# Patient Record
(demographics unavailable — no encounter records)

---

## 2024-10-07 NOTE — PHYSICAL EXAM
[de-identified] :   Bilateral breast/axilla/supraclavicular area: No discharge, or adenopathy.  3 cm hematoma retroareolar right breast

## 2024-10-07 NOTE — PLAN
[TextEntry] : Reviewed imaging findings and discussed results with patient. Discussed diagnosis of ALH with the patient. Patient is agreeable to continue with image surveillance at this time.  I discussed genetic testing with the patient in view of strong family history of breast cancer.  Patient states that she is interested, however is not ready to have testing performed now and will reach out to the office when she is ready. Patient will have B/L MG&US now as she is over due. If benign, she is to have short term follow up MRI 3/2025 as a short-term follow-up was recommended to reassess the benign biopsied area.. If benign, she is to return in 1 year for annual B/l MG & US and office visit.  We also discussed antiestrogen therapy for risk reduction and offered patient consultation with medical oncologist.  Patient states she is not interested at this time.

## 2024-10-07 NOTE — FAMILY HISTORY
[TextEntry] : Family history of breast cancer in mother ~41 Family history of breast cancer in sister in her mid 50s Denies family history of ovarian cancer

## 2024-10-07 NOTE — PHYSICAL EXAM
[de-identified] :   Bilateral breast/axilla/supraclavicular area: No discharge, or adenopathy.  3 cm hematoma retroareolar right breast

## 2024-10-07 NOTE — PAST MEDICAL HISTORY
[Postmenopausal] : The patient is postmenopausal [Menarche Age ____] : age at menarche was [unfilled] [Menopause Age____] : age at menopause was [unfilled] [Approximately ___] : the LMP was approximately [unfilled] [Total Preg ___] : G[unfilled] [Live Births ___] : P[unfilled]  [Age At Live Birth ___] : Age at live birth: [unfilled] [History of Hormone Replacement Treatment] : has no history of hormone replacement treatment [FreeTextEntry5] : Breast biopsy L breast needle aspiration   [FreeTextEntry6] : No [FreeTextEntry7] : No [FreeTextEntry8] : Yes, for a couple months

## 2024-10-07 NOTE — DISCUSSION/SUMMARY
[FreeTextEntry1] : REASON FOR CONSULT Brunilda Rodrigues is a 64-year-old female who was referred by Dr. Tonya Anthony for cancer genetic counseling and risk assessment due to a family history of cancer.  RELEVANT MEDICAL HISTORY Ms. Rodrigues is a healthy individual who has never had cancer. She has a family history of cancer, see below.  OTHER MEDICAL AND SURGICAL HISTORY:  -	 Medical History: No major medical history reported. -	Surgical History: appendectomy (),  section x3   PAST OB/GYN HISTORY:  Obstetrical History:   Age at Menarche: 14  Menopausal with LMP in her early 50's   Age at First Live Birth: 32  Oral Contraceptive Use: No Hormone Replacement Therapy: No   CANCER SCREENING HISTORY:    Breast:  -	Mammography: last 2023, normal -	Sonography: last 2023, normal  -	MRI: last 2024, two focal areas of nonmass enhancement were identified in the right breast. Biopsy was recommended for the right breast findings. Two small enhancing probably benign masses were identified in the left breast for which follow-up imaging was recommended in 6 months.  -	Biopsies: 2024-Right, atypical lobular hyperplasia involving fibroadenomatoid change GYN: -	Pelvic Examination: last 2024, reportedly normal -	Sonography: No -	CA-125: No Colon: -	Colonoscopy: last 7 years ago, reportedly normal. Next colonoscopy was recommended in 7 years and is scheduled for later this year (). Patient reports a history of colorectal polyps on a previous colonoscopy. She reports that she is unsure how many polyps were identified, but she reports that they were benign. -	Upper Endoscopy: No Skin:   -	FBSE: last >2 years ago, reportedly normal. Next appointment is scheduled for this year (). -	Lesions biopsied/removed: Yes, patient reports having benign skin lesions excised.  SOCIAL HISTORY: -	Tobacco-product use: No   FAMILY HISTORY: Maternal ancestry was reported as English and Stateless and paternal ancestry was reported as Vietnamese. Ashkenazi Yazidi ancestry and consanguinity were denied. Of note, Ms. Rodrigues reports that she does not have access to medical history for her maternal and paternal extended relatives at this time due to limited contact. A detailed family history of cancer was ascertained. Relevant diagnoses are detailed below and in the scanned pedigree.   To Ms. Rodrigues's knowledge, no one in the family has had germline testing for cancer susceptibility.   	 	RISK ASSESSMENT: Ms. Rodrigues's family history of breast cancer is suggestive of an inherited predisposition to breast cancer and related cancers. We recommended genetic testing for genes associated with breast and gynecological cancer.   We discussed the risks, benefits and limitations, and implications of genetic testing. We also discussed the psychosocial implications of genetic testing. Possible test results were reviewed with Ms. Rodrigues, along with associated medical management options. The Genetic Information Non-discrimination Act ([place]) was also reviewed.   Following our discussion, Ms. Rodrigues declined genetic testing today. She stated that she would like to think further about the option of genetic testing and she will recontact [facility] if she would like to pursue genetic testing in the future. She was made aware that we remain available to her should she have any additional questions or would like to pursue genetic testing at any point in the future.   PLAN:  1. Ms. Rodrigues declined genetic testing today. She will recontact [facility] if she would like to pursue genetic testing.  For any additional questions please call [facility] at [phone].    Gayla Han MS, [facility] Genetic Counselor, [facility]   CC:  Dr. Tonya Anthony

## 2024-10-07 NOTE — HISTORY OF PRESENT ILLNESS
[FreeTextEntry1] :  Patient is a 65yo F who presents today for initial evaluation of R ALH. She was referred by [facility], Dr. Campos. She has hx of L yellow nipple discharge during MG (4/22), patient states that she intermittently experiences yellow nipple discharge from the L breast spontaneous and nonspontaneous. Family history of breast cancer in her mother DOD 41 and sister DOD 54 (No genetic testing for family members). No personal history of genetic testing. Denies family history of ovarian cancer. Patient denies palpable masses, skin changes, or nipple discharge bilaterally.   BOLA Lifetime Risk- 40.4%  4/11/2022 B/L MG&US (medical imaging [facility])- Heterogeneously dense. L yellow nipple discharge elicited during mammogram. BIRADS 2  7/31/2023 B/L MG&US (medical imaging [facility])- Heterogeneously dense. NILDA. BIRADS 2  8/13/2024 MRI- FIBROGLANDULAR. L Few scattered foci of enhancement, specifically, 0.5cm enhancing mass 6:00 5-6 cfn and 0.3cm enhancing mass RA (rec 6 month f/u mri). R 1cm focal area NME medial RA (rec mri bx). Adjacent R 1cm focal area of NME RA. (management based on results of biopsy) BIRADS 4 9/9/24 R MRI [facility] ([facility])- Atypical lobular hyperplasia involving fibroadenomatoid change. HIGH RISK AND CONCORDANT. REC SURG CONSULT. **1cm lateral migration of tissue marker clip.  If site is not surgically excised, rec 6 month follow up mri. Currently due for screening mg.

## 2024-10-07 NOTE — HISTORY OF PRESENT ILLNESS
[FreeTextEntry1] :  Patient is a 65yo F who presents today for initial evaluation of R ALH. She was referred by [facility], Dr. Campos. She has hx of L yellow nipple discharge during MG (4/22), patient states that she intermittently experiences yellow nipple discharge from the L breast spontaneous and nonspontaneous. Family history of breast cancer in her mother DOD 41 and sister DOD 54 (No genetic testing for family members). No personal history of genetic testing. Denies family history of ovarian cancer. Patient denies palpable masses, skin changes, or nipple discharge bilaterally.   BOLA Lifetime Risk- 40.4%  4/11/2022 B/L MG&US (medical imaging [facility])- Heterogeneously dense. L yellow nipple discharge elicited during mammogram. BIRADS 2  7/31/2023 B/L MG&US (medical imaging [facility])- Heterogeneously dense. NILDA. BIRADS 2  8/13/2024 MRI- FIBROGLANDULAR. L Few scattered foci of enhancement, specifically, 0.5cm enhancing mass 6:00 5-6 cfn and 0.3cm enhancing mass RA (rec 6 month f/u mri). R 1cm focal area NME medial RA (rec mri bx). Adjacent R 1cm focal area of NME RA. (management based on results of biopsy) BIRADS 4 9/9/24 R MRI [facility] ([facility])- Atypical lobular hyperplasia involving fibroadenomatoid change. HIGH RISK AND CONCORDANT. REC SURG CONSULT. **1cm lateral migration of tissue marker clip.  If site is not surgically excised, rec 6 month follow up mri. Currently due for screening mg.

## 2024-11-19 NOTE — DISCUSSION/SUMMARY
[FreeTextEntry1] : Patient returned to clinic today to pursue genetic testing. Consent form was signed and blood was drawn and will be sent to [facility] for analysis. Patient will be contacted once results are finalized.  Natali Gómez MS, [facility] Counselor